# Patient Record
(demographics unavailable — no encounter records)

---

## 2025-02-10 NOTE — DISCUSSION/SUMMARY
[FreeTextEntry1] : 1.  Palpitations  -ECG with NSR  -Given the intermittent nature of her palpitations, I will obtain an MCOT to rule out arrythmias.    2.  Dyspnea on exertion.  -Will obtain an echocardiogram to evaluate LV function and rule out valvular heart disease  -Given her risk factors including strong FHx and symptoms, I will obtain a stress echocardiogram to rule out cad as a cause of her symptoms.   3.  HLD  - The patient would like to hold off on antihyperlipidemia medications at this time  -Therefore recommend a low fat low cholesterol diet   Will follow up with the patient at the time of her stress test.   [EKG obtained to assist in diagnosis and management of assessed problem(s)] : EKG obtained to assist in diagnosis and management of assessed problem(s)

## 2025-02-10 NOTE — ASSESSMENT
[FreeTextEntry1] : I had the pleasure of seeing your patient Ms. Shameka Bernard in the office today for cardiovascular evaluation.  As you know, she is a very pleasant 41 year old female with history of HLD and FHx of CAD who presents today for cardiac consultation.

## 2025-03-11 NOTE — REVIEW OF SYSTEMS
[Negative] : Heme/Lymph [Chest Discomfort] : no chest discomfort [Lower Ext Edema] : no extremity edema [Leg Claudication] : no intermittent leg claudication [Syncope] : no syncope [Cough] : no cough [Wheezing] : no wheezing [FreeTextEntry5] : (+) Prior MAURICIO, (+) Prior Palpitations [FreeTextEntry6] : (+) Prior MAURICIO

## 2025-03-11 NOTE — CARDIOLOGY SUMMARY
[de-identified] : 02/10/25: SR, normal intervals, no st-t changes  =======================================================

## 2025-03-11 NOTE — HISTORY OF PRESENT ILLNESS
[FreeTextEntry1] : PANCHITO CARLOS is a pleasant 41-year-old female, with a PMHx significant for HLD and FHx of CAD, who presents today for cardiovascular follow-up. On her last visit, the patient was complaining of non-daily, intermittent palpitations for the last several months, as well as occasional episodes of dyspnea. Symptoms have since resolved. Patient underwent an MCOT which demonstrated no significant arrhythmias. Denies any further episodes of dyspnea or anginal symptoms. Patient underwent a stress test today for further evaluation.   Family History: (+) CAD: father.

## 2025-03-11 NOTE — CARDIOLOGY SUMMARY
[de-identified] : 02/10/25: SR, normal intervals, no st-t changes  =======================================================

## 2025-03-11 NOTE — ASSESSMENT
[FreeTextEntry1] : PANCHITO CARLOS is a pleasant 41-year-old female, with a PMHx significant for HLD and FHx of CAD, who presents today for cardiovascular follow-up.

## 2025-03-11 NOTE — DISCUSSION/SUMMARY
[FreeTextEntry1] : 1.  Palpitations  - Patient underwent an MCOT which revealed no significant arrhythmias and a PVC burden <1%.  - Will continue to monitor symptoms. If there is recurrence of symptoms in the future, will consider further work up. At this time, no further cardiac intervention warranted at the current time.  2.  Dyspnea on Exertion:  - Patient underwent stress echocardiogram today demonstrating normal augmentation with no evidence of ischemia. Baseline echocardiogram did demonstrate mild eccentric MR that I will follow with serial echos as clinically indicated.  - Given resolution of symptoms and normal stress echo, no further cardiac intervention warranted at the current time.  3.  HLD  - The patient would like to hold off on antihyperlipidemic medications at this time  - Therefore, recommend a low-fat/low-cholesterol diet. - Other planned treatment includes exercise and weight loss.  4. Health Maintenance: - Patient has strong family hx and is requesting a CACS to assess cardiac risk. Recommend a CAC score to risk stratify the patient.  Instructed to follow up in 4-6 weeks, or sooner for new or worsening symptoms.  Plan was discussed with the patient.